# Patient Record
Sex: MALE | Race: WHITE | NOT HISPANIC OR LATINO | URBAN - NONMETROPOLITAN AREA
[De-identification: names, ages, dates, MRNs, and addresses within clinical notes are randomized per-mention and may not be internally consistent; named-entity substitution may affect disease eponyms.]

---

## 2021-02-16 ENCOUNTER — OFFICE VISIT (OUTPATIENT)
Dept: URBAN - NONMETROPOLITAN AREA CLINIC 13 | Facility: CLINIC | Age: 56
End: 2021-02-16
Payer: COMMERCIAL

## 2021-02-16 ENCOUNTER — DASHBOARD ENCOUNTERS (OUTPATIENT)
Age: 56
End: 2021-02-16

## 2021-02-16 ENCOUNTER — LAB OUTSIDE AN ENCOUNTER (OUTPATIENT)
Dept: URBAN - NONMETROPOLITAN AREA CLINIC 13 | Facility: CLINIC | Age: 56
End: 2021-02-16

## 2021-02-16 VITALS
BODY MASS INDEX: 22.05 KG/M2 | HEIGHT: 70 IN | DIASTOLIC BLOOD PRESSURE: 77 MMHG | SYSTOLIC BLOOD PRESSURE: 126 MMHG | HEART RATE: 79 BPM | TEMPERATURE: 98 F | WEIGHT: 154 LBS

## 2021-02-16 DIAGNOSIS — K86.2 PANCREATIC CYST: ICD-10-CM

## 2021-02-16 DIAGNOSIS — D48.1 HEMANGIOPERICYTOMA: ICD-10-CM

## 2021-02-16 PROCEDURE — 99244 OFF/OP CNSLTJ NEW/EST MOD 40: CPT | Performed by: INTERNAL MEDICINE

## 2021-02-16 PROCEDURE — 99204 OFFICE O/P NEW MOD 45 MIN: CPT | Performed by: INTERNAL MEDICINE

## 2021-02-16 PROCEDURE — G8482 FLU IMMUNIZE ORDER/ADMIN: HCPCS | Performed by: INTERNAL MEDICINE

## 2021-02-16 NOTE — HPI-TODAY'S VISIT:
2/16/2021 Mr. Dwaine Zaman is a 55 year old male referred by Dr Ballard for abnormal imaging of the pancreas. 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 He had surgery to remove a 17cm tumor. He was told this would not likely reoccur or metastasize. He developed a night time cough that led to a CXR. This returned with two nodules. He had a CT that showed another primary tumor in his lower pelvis and nodules in his lungs. He had surgery in October to remove the primary tumor. He then had a SBO due to adhesions in November. He is now getting imaging every 3 months to watch his lung nodules. It was discovered in January that he had a cystic lesion in the tail of his pancreas that had changed in size. He had a MRI that showed a 2.5 x 1.5cm multilocular cystic pancreatic tail with thin enhancing eptation and a micronodular compoenent measuring 4mm.  CS

## 2021-02-26 ENCOUNTER — OFFICE VISIT (OUTPATIENT)
Dept: URBAN - METROPOLITAN AREA MEDICAL CENTER 1 | Facility: MEDICAL CENTER | Age: 56
End: 2021-02-26
Payer: COMMERCIAL

## 2021-02-26 DIAGNOSIS — D48.1 HEMANGIOPERICYTOMA: ICD-10-CM

## 2021-02-26 DIAGNOSIS — K86.89 ATROPHIC PANCREAS: ICD-10-CM

## 2021-02-26 PROCEDURE — 43242 EGD US FINE NEEDLE BX/ASPIR: CPT | Performed by: INTERNAL MEDICINE
